# Patient Record
Sex: MALE | Race: WHITE
[De-identification: names, ages, dates, MRNs, and addresses within clinical notes are randomized per-mention and may not be internally consistent; named-entity substitution may affect disease eponyms.]

---

## 2017-01-09 ENCOUNTER — HOSPITAL ENCOUNTER (OUTPATIENT)
Dept: HOSPITAL 35 - HYPER | Age: 67
End: 2017-01-09
Attending: PHYSICIAN ASSISTANT
Payer: COMMERCIAL

## 2017-01-09 DIAGNOSIS — Z89.512: ICD-10-CM

## 2017-01-09 DIAGNOSIS — Z79.84: ICD-10-CM

## 2017-01-09 DIAGNOSIS — E11.621: Primary | ICD-10-CM

## 2017-01-09 DIAGNOSIS — Z89.421: ICD-10-CM

## 2017-01-09 DIAGNOSIS — L97.512: ICD-10-CM

## 2017-01-09 DIAGNOSIS — I10: ICD-10-CM

## 2017-03-29 ENCOUNTER — HOSPITAL ENCOUNTER (OUTPATIENT)
Dept: HOSPITAL 35 - HYPER | Age: 67
End: 2017-03-29
Attending: PREVENTIVE MEDICINE
Payer: COMMERCIAL

## 2017-03-29 DIAGNOSIS — E11.69: ICD-10-CM

## 2017-03-29 DIAGNOSIS — I10: ICD-10-CM

## 2017-03-29 DIAGNOSIS — L97.511: ICD-10-CM

## 2017-03-29 DIAGNOSIS — Z79.84: ICD-10-CM

## 2017-03-29 DIAGNOSIS — E11.621: Primary | ICD-10-CM

## 2017-04-25 ENCOUNTER — HOSPITAL ENCOUNTER (OUTPATIENT)
Dept: HOSPITAL 35 - HYPER | Age: 67
End: 2017-04-25
Attending: SPECIALIST
Payer: COMMERCIAL

## 2017-04-25 DIAGNOSIS — S98.211D: ICD-10-CM

## 2017-04-25 DIAGNOSIS — I10: ICD-10-CM

## 2017-04-25 DIAGNOSIS — L97.512: ICD-10-CM

## 2017-04-25 DIAGNOSIS — E11.621: Primary | ICD-10-CM

## 2017-04-25 DIAGNOSIS — Z79.84: ICD-10-CM

## 2017-05-17 ENCOUNTER — HOSPITAL ENCOUNTER (OUTPATIENT)
Dept: HOSPITAL 35 - HYPER | Age: 67
End: 2017-05-17
Attending: PREVENTIVE MEDICINE
Payer: COMMERCIAL

## 2017-05-17 DIAGNOSIS — Z79.84: ICD-10-CM

## 2017-05-17 DIAGNOSIS — I10: ICD-10-CM

## 2017-05-17 DIAGNOSIS — Z89.512: ICD-10-CM

## 2017-05-17 DIAGNOSIS — L97.512: ICD-10-CM

## 2017-05-17 DIAGNOSIS — E11.621: Primary | ICD-10-CM

## 2017-05-17 DIAGNOSIS — Z89.421: ICD-10-CM

## 2017-06-07 ENCOUNTER — HOSPITAL ENCOUNTER (OUTPATIENT)
Dept: HOSPITAL 35 - HYPER | Age: 67
End: 2017-06-07
Attending: PREVENTIVE MEDICINE
Payer: COMMERCIAL

## 2017-06-07 DIAGNOSIS — Z79.84: ICD-10-CM

## 2017-06-07 DIAGNOSIS — E11.621: Primary | ICD-10-CM

## 2017-06-07 DIAGNOSIS — Z89.512: ICD-10-CM

## 2017-06-07 DIAGNOSIS — L97.512: ICD-10-CM

## 2017-06-07 DIAGNOSIS — I10: ICD-10-CM

## 2017-06-07 DIAGNOSIS — Z89.421: ICD-10-CM

## 2017-06-21 ENCOUNTER — HOSPITAL ENCOUNTER (OUTPATIENT)
Dept: HOSPITAL 35 - HYPER | Age: 67
End: 2017-06-21
Attending: PREVENTIVE MEDICINE
Payer: COMMERCIAL

## 2017-06-21 DIAGNOSIS — Z89.512: ICD-10-CM

## 2017-06-21 DIAGNOSIS — L97.512: ICD-10-CM

## 2017-06-21 DIAGNOSIS — Z89.431: ICD-10-CM
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
## 2017-06-21 DIAGNOSIS — S98.211D: ICD-10-CM

## 2017-06-21 DIAGNOSIS — Z79.84: ICD-10-CM

## 2017-06-21 DIAGNOSIS — E11.621: Primary | ICD-10-CM

## 2017-07-05 ENCOUNTER — HOSPITAL ENCOUNTER (OUTPATIENT)
Dept: HOSPITAL 35 - HYPER | Age: 67
End: 2017-07-05
Attending: PREVENTIVE MEDICINE
Payer: COMMERCIAL

## 2017-07-05 DIAGNOSIS — L97.512: ICD-10-CM

## 2017-07-05 DIAGNOSIS — E11.621: Primary | ICD-10-CM

## 2017-07-05 DIAGNOSIS — Z89.421: ICD-10-CM

## 2017-07-05 DIAGNOSIS — Z79.84: ICD-10-CM

## 2017-07-05 DIAGNOSIS — Z89.512: ICD-10-CM

## 2017-07-05 DIAGNOSIS — I10: ICD-10-CM

## 2017-07-20 ENCOUNTER — HOSPITAL ENCOUNTER (INPATIENT)
Dept: HOSPITAL 35 - TBA | Age: 67
LOS: 2 days | Discharge: HOME | DRG: 617 | End: 2017-07-22
Attending: ORTHOPAEDIC SURGERY | Admitting: ORTHOPAEDIC SURGERY
Payer: COMMERCIAL

## 2017-07-20 VITALS — HEIGHT: 67.99 IN | WEIGHT: 235 LBS | BODY MASS INDEX: 35.61 KG/M2

## 2017-07-20 VITALS — SYSTOLIC BLOOD PRESSURE: 103 MMHG | DIASTOLIC BLOOD PRESSURE: 62 MMHG

## 2017-07-20 VITALS — DIASTOLIC BLOOD PRESSURE: 81 MMHG | SYSTOLIC BLOOD PRESSURE: 117 MMHG

## 2017-07-20 DIAGNOSIS — Z89.421: ICD-10-CM

## 2017-07-20 DIAGNOSIS — Z89.512: ICD-10-CM

## 2017-07-20 DIAGNOSIS — E11.69: Primary | ICD-10-CM

## 2017-07-20 DIAGNOSIS — I10: ICD-10-CM

## 2017-07-20 DIAGNOSIS — B95.62: ICD-10-CM

## 2017-07-20 DIAGNOSIS — Z79.899: ICD-10-CM

## 2017-07-20 DIAGNOSIS — K21.9: ICD-10-CM

## 2017-07-20 DIAGNOSIS — E11.621: ICD-10-CM

## 2017-07-20 DIAGNOSIS — Z79.4: ICD-10-CM

## 2017-07-20 DIAGNOSIS — M19.90: ICD-10-CM

## 2017-07-20 DIAGNOSIS — M86.171: ICD-10-CM

## 2017-07-20 DIAGNOSIS — E11.42: ICD-10-CM

## 2017-07-20 LAB
ANION GAP SERPL CALC-SCNC: 9 MMOL/L (ref 7–16)
BUN SERPL-MCNC: 26 MG/DL (ref 7–18)
CALCIUM SERPL-MCNC: 9.3 MG/DL (ref 8.5–10.1)
CHLORIDE SERPL-SCNC: 102 MMOL/L (ref 98–107)
CO2 SERPL-SCNC: 28 MMOL/L (ref 21–32)
CREAT SERPL-MCNC: 0.9 MG/DL (ref 0.7–1.3)
ERYTHROCYTE [DISTWIDTH] IN BLOOD BY AUTOMATED COUNT: 15 % (ref 10.5–14.5)
GLUCOSE SERPL-MCNC: 133 MG/DL (ref 74–106)
HCT VFR BLD CALC: 47.6 % (ref 42–52)
HGB BLD-MCNC: 16.1 GM/DL (ref 14–18)
MCH RBC QN AUTO: 27.2 PG (ref 26–34)
MCHC RBC AUTO-ENTMCNC: 33.9 G/DL (ref 28–37)
MCV RBC: 80.2 FL (ref 80–100)
PLATELET # BLD: 138 THOU/UL (ref 150–400)
POTASSIUM SERPL-SCNC: 4.1 MMOL/L (ref 3.5–5.1)
RBC # BLD AUTO: 5.93 MIL/UL (ref 4.5–6)
SODIUM SERPL-SCNC: 139 MMOL/L (ref 136–145)
WBC # BLD AUTO: 6.7 THOU/UL (ref 4–11)

## 2017-07-20 PROCEDURE — 57091: CPT

## 2017-07-20 PROCEDURE — 0QBN0ZZ EXCISION OF RIGHT METATARSAL, OPEN APPROACH: ICD-10-PCS | Performed by: ORTHOPAEDIC SURGERY

## 2017-07-20 PROCEDURE — 10785: CPT

## 2017-07-20 PROCEDURE — 62900: CPT

## 2017-07-20 PROCEDURE — 50951 ENDOSCOPY OF URETER: CPT

## 2017-07-20 PROCEDURE — 56525: CPT

## 2017-07-20 PROCEDURE — 50010 RENAL EXPLORATION: CPT

## 2017-07-20 PROCEDURE — 62110: CPT

## 2017-07-20 PROCEDURE — 0Y6M0ZC DETACHMENT AT RIGHT FOOT, PARTIAL 3RD RAY, OPEN APPROACH: ICD-10-PCS | Performed by: ORTHOPAEDIC SURGERY

## 2017-07-20 PROCEDURE — 70005: CPT

## 2017-07-20 PROCEDURE — 50386 REMOVE STENT VIA TRANSURETH: CPT

## 2017-07-20 PROCEDURE — 50101: CPT

## 2017-07-21 VITALS — SYSTOLIC BLOOD PRESSURE: 111 MMHG | DIASTOLIC BLOOD PRESSURE: 53 MMHG

## 2017-07-21 VITALS — SYSTOLIC BLOOD PRESSURE: 127 MMHG | DIASTOLIC BLOOD PRESSURE: 85 MMHG

## 2017-07-21 VITALS — DIASTOLIC BLOOD PRESSURE: 68 MMHG | SYSTOLIC BLOOD PRESSURE: 118 MMHG

## 2017-07-21 VITALS — SYSTOLIC BLOOD PRESSURE: 146 MMHG | DIASTOLIC BLOOD PRESSURE: 80 MMHG

## 2017-07-21 LAB
ANION GAP SERPL CALC-SCNC: 7 MMOL/L (ref 7–16)
CHLORIDE SERPL-SCNC: 102 MMOL/L (ref 98–107)
CO2 SERPL-SCNC: 28 MMOL/L (ref 21–32)
HCT VFR BLD CALC: 44.8 % (ref 42–52)
HGB BLD-MCNC: 14.8 GM/DL (ref 14–18)
POTASSIUM SERPL-SCNC: 4 MMOL/L (ref 3.5–5.1)
SODIUM SERPL-SCNC: 137 MMOL/L (ref 136–145)

## 2017-07-22 VITALS — SYSTOLIC BLOOD PRESSURE: 131 MMHG | DIASTOLIC BLOOD PRESSURE: 75 MMHG

## 2017-07-22 VITALS — SYSTOLIC BLOOD PRESSURE: 127 MMHG | DIASTOLIC BLOOD PRESSURE: 85 MMHG

## 2017-07-22 VITALS — SYSTOLIC BLOOD PRESSURE: 142 MMHG | DIASTOLIC BLOOD PRESSURE: 85 MMHG

## 2017-07-27 ENCOUNTER — HOSPITAL ENCOUNTER (OUTPATIENT)
Dept: HOSPITAL 35 - HYPER | Age: 67
End: 2017-07-27
Attending: PREVENTIVE MEDICINE
Payer: COMMERCIAL

## 2017-07-27 DIAGNOSIS — Z89.512: ICD-10-CM

## 2017-07-27 DIAGNOSIS — L97.512: ICD-10-CM

## 2017-07-27 DIAGNOSIS — Z89.421: ICD-10-CM

## 2017-07-27 DIAGNOSIS — Z79.84: ICD-10-CM

## 2017-07-27 DIAGNOSIS — E11.621: ICD-10-CM

## 2017-07-27 DIAGNOSIS — T81.89XD: Primary | ICD-10-CM
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
## 2017-07-27 DIAGNOSIS — I10: ICD-10-CM

## 2017-08-10 ENCOUNTER — HOSPITAL ENCOUNTER (OUTPATIENT)
Dept: HOSPITAL 35 - HYPER | Age: 67
End: 2017-08-10
Attending: PREVENTIVE MEDICINE
Payer: COMMERCIAL

## 2017-08-10 DIAGNOSIS — L97.512: ICD-10-CM

## 2017-08-10 DIAGNOSIS — I10: ICD-10-CM
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
## 2017-08-10 DIAGNOSIS — E11.621: ICD-10-CM

## 2017-08-10 DIAGNOSIS — Z89.431: ICD-10-CM

## 2017-08-10 DIAGNOSIS — Z89.512: ICD-10-CM

## 2017-08-10 DIAGNOSIS — T81.89XD: Primary | ICD-10-CM

## 2017-08-10 DIAGNOSIS — Z79.84: ICD-10-CM

## 2017-08-10 DIAGNOSIS — Z79.2: ICD-10-CM

## 2017-08-18 ENCOUNTER — HOSPITAL ENCOUNTER (OUTPATIENT)
Dept: HOSPITAL 35 - HYPER | Age: 67
End: 2017-08-18
Attending: PREVENTIVE MEDICINE
Payer: COMMERCIAL

## 2017-08-18 DIAGNOSIS — Z79.2: ICD-10-CM

## 2017-08-18 DIAGNOSIS — Z79.84: ICD-10-CM

## 2017-08-18 DIAGNOSIS — E11.69: ICD-10-CM

## 2017-08-18 DIAGNOSIS — L97.512: ICD-10-CM

## 2017-08-18 DIAGNOSIS — I10: ICD-10-CM

## 2017-08-18 DIAGNOSIS — Z89.512: ICD-10-CM

## 2017-08-18 DIAGNOSIS — E11.621: Primary | ICD-10-CM

## 2017-08-18 DIAGNOSIS — Z89.421: ICD-10-CM

## 2018-01-10 ENCOUNTER — HOSPITAL ENCOUNTER (OUTPATIENT)
Dept: HOSPITAL 61 - PCVCCLINIC | Age: 68
Discharge: HOME | End: 2018-01-10
Attending: INTERNAL MEDICINE
Payer: MEDICARE

## 2018-01-10 DIAGNOSIS — R94.31: ICD-10-CM

## 2018-01-10 DIAGNOSIS — E11.9: ICD-10-CM

## 2018-01-10 DIAGNOSIS — Z79.84: ICD-10-CM

## 2018-01-10 DIAGNOSIS — E78.5: ICD-10-CM

## 2018-01-10 DIAGNOSIS — Z79.899: ICD-10-CM

## 2018-01-10 DIAGNOSIS — I10: Primary | ICD-10-CM

## 2018-01-10 DIAGNOSIS — I45.10: ICD-10-CM

## 2018-01-10 DIAGNOSIS — I45.2: ICD-10-CM

## 2018-01-10 PROCEDURE — 80061 LIPID PANEL: CPT

## 2018-01-10 PROCEDURE — 93005 ELECTROCARDIOGRAM TRACING: CPT

## 2018-01-30 ENCOUNTER — HOSPITAL ENCOUNTER (OUTPATIENT)
Dept: HOSPITAL 61 - PCVCIMAG | Age: 68
Discharge: HOME | End: 2018-01-30
Attending: INTERNAL MEDICINE
Payer: MEDICARE

## 2018-01-30 DIAGNOSIS — E11.9: ICD-10-CM

## 2018-01-30 DIAGNOSIS — I87.2: Primary | ICD-10-CM

## 2018-01-30 DIAGNOSIS — Z79.899: ICD-10-CM

## 2018-01-30 DIAGNOSIS — E78.00: ICD-10-CM

## 2018-01-30 DIAGNOSIS — Z79.84: ICD-10-CM

## 2018-01-30 DIAGNOSIS — I10: ICD-10-CM

## 2018-01-30 DIAGNOSIS — I73.9: ICD-10-CM

## 2018-01-30 PROCEDURE — 93971 EXTREMITY STUDY: CPT

## 2018-01-30 PROCEDURE — 93926 LOWER EXTREMITY STUDY: CPT

## 2018-07-18 ENCOUNTER — HOSPITAL ENCOUNTER (OUTPATIENT)
Dept: HOSPITAL 61 - PCVCIMAG | Age: 68
Discharge: HOME | End: 2018-07-18
Attending: INTERNAL MEDICINE
Payer: MEDICARE

## 2018-07-18 DIAGNOSIS — E78.5: ICD-10-CM

## 2018-07-18 DIAGNOSIS — E11.9: ICD-10-CM

## 2018-07-18 DIAGNOSIS — Z79.84: ICD-10-CM

## 2018-07-18 DIAGNOSIS — Z87.891: ICD-10-CM

## 2018-07-18 DIAGNOSIS — E78.00: Primary | ICD-10-CM

## 2018-07-18 DIAGNOSIS — R94.31: ICD-10-CM

## 2018-07-18 DIAGNOSIS — I10: ICD-10-CM

## 2018-07-18 DIAGNOSIS — I45.2: ICD-10-CM

## 2018-07-18 PROCEDURE — 93306 TTE W/DOPPLER COMPLETE: CPT

## 2018-07-18 PROCEDURE — 80061 LIPID PANEL: CPT

## 2018-07-18 PROCEDURE — 93005 ELECTROCARDIOGRAM TRACING: CPT

## 2019-01-22 ENCOUNTER — HOSPITAL ENCOUNTER (OUTPATIENT)
Dept: HOSPITAL 61 - PCVCCLINIC | Age: 69
Discharge: HOME | End: 2019-01-22
Attending: RADIOLOGY
Payer: MEDICARE

## 2019-01-22 DIAGNOSIS — Z79.84: ICD-10-CM

## 2019-01-22 DIAGNOSIS — E11.9: ICD-10-CM

## 2019-01-22 DIAGNOSIS — I73.9: ICD-10-CM

## 2019-01-22 DIAGNOSIS — E78.00: ICD-10-CM

## 2019-01-22 DIAGNOSIS — I10: ICD-10-CM

## 2019-01-22 DIAGNOSIS — I87.2: Primary | ICD-10-CM

## 2019-01-22 DIAGNOSIS — R94.31: ICD-10-CM

## 2019-01-22 DIAGNOSIS — I45.2: ICD-10-CM

## 2019-01-22 PROCEDURE — 93005 ELECTROCARDIOGRAM TRACING: CPT

## 2019-01-22 PROCEDURE — G0463 HOSPITAL OUTPT CLINIC VISIT: HCPCS

## 2019-01-24 ENCOUNTER — HOSPITAL ENCOUNTER (OUTPATIENT)
Dept: HOSPITAL 61 - PCVCCLINIC | Age: 69
Discharge: HOME | End: 2019-01-24
Attending: INTERNAL MEDICINE
Payer: MEDICARE

## 2019-01-24 DIAGNOSIS — E11.9: ICD-10-CM

## 2019-01-24 DIAGNOSIS — Z79.899: ICD-10-CM

## 2019-01-24 DIAGNOSIS — I45.2: ICD-10-CM

## 2019-01-24 DIAGNOSIS — E78.5: ICD-10-CM

## 2019-01-24 DIAGNOSIS — I10: Primary | ICD-10-CM

## 2019-01-24 PROCEDURE — 93005 ELECTROCARDIOGRAM TRACING: CPT

## 2019-01-24 PROCEDURE — 36415 COLL VENOUS BLD VENIPUNCTURE: CPT

## 2019-01-24 PROCEDURE — 80061 LIPID PANEL: CPT

## 2019-01-24 PROCEDURE — G0463 HOSPITAL OUTPT CLINIC VISIT: HCPCS

## 2019-02-15 ENCOUNTER — HOSPITAL ENCOUNTER (OUTPATIENT)
Dept: HOSPITAL 61 - PCVCINTER | Age: 69
Discharge: HOME | End: 2019-02-15
Attending: RADIOLOGY
Payer: MEDICARE

## 2019-02-15 DIAGNOSIS — Z89.432: ICD-10-CM

## 2019-02-15 DIAGNOSIS — E78.00: ICD-10-CM

## 2019-02-15 DIAGNOSIS — I87.2: ICD-10-CM

## 2019-02-15 DIAGNOSIS — I10: ICD-10-CM

## 2019-02-15 DIAGNOSIS — Z98.890: ICD-10-CM

## 2019-02-15 DIAGNOSIS — I45.2: ICD-10-CM

## 2019-02-15 DIAGNOSIS — Z72.89: ICD-10-CM

## 2019-02-15 DIAGNOSIS — I83.811: Primary | ICD-10-CM

## 2019-02-15 DIAGNOSIS — E11.9: ICD-10-CM

## 2019-02-15 DIAGNOSIS — Z79.84: ICD-10-CM

## 2019-02-15 DIAGNOSIS — Z79.899: ICD-10-CM

## 2019-02-15 DIAGNOSIS — Z89.421: ICD-10-CM

## 2019-02-15 PROCEDURE — C1751 CATH, INF, PER/CENT/MIDLINE: HCPCS

## 2019-02-15 PROCEDURE — 36478 ENDOVENOUS LASER 1ST VEIN: CPT

## 2019-02-15 PROCEDURE — C1769 GUIDE WIRE: HCPCS

## 2019-02-15 PROCEDURE — C1894 INTRO/SHEATH, NON-LASER: HCPCS

## 2019-02-15 PROCEDURE — 36479 ENDOVENOUS LASER VEIN ADDON: CPT

## 2019-02-15 PROCEDURE — 37765 STAB PHLEB VEINS XTR 10-20: CPT

## 2019-02-15 NOTE — PCVCINTER
EXAM:

1.  RIGHT GREAT SAPHENOUS VEIN ENDOVENOUS LASER ABLATION

2. MICRO STAB PHLEBECTOMIES OF THE MEDIAL RIGHT THIGH AND KNEE.



INDICATION: Chronic Venous Insufficiency Class 4a. Leg pain and

swelling. Failed conservative therapy including medical grade

compression stockings for at least 3 months. Venous hypertension

chronic.



PROCEDURE:

Procedure and risks of endovenous laser ablation including thrombosis,

vascular injury, nerve injury, skin necrosis, and infection were

discussed with the patient and consent obtained. The right leg was

prepped and draped in the normal sterile fashion. Using ultrasound

guidance access into the right great saphenous vein was obtained and a

5F 75 cm long catheter was advanced to 2 cm below the saphenofemoral

junction. The laser fiber was advanced through the catheter to its tip

and the catheter partially retracted. Abundant tumescent anesthesia

using a dilute lidocaine solution was given in the perivenous tissues

throughout the length of the laser fiber. Ultrasound confirmed good

position of the distal tip of the laser fiber as well as direct

transcutaneous visualization. The 1470 Dornier laser was set to 6

fuller and a slow continuous pull-back technique employed to deliver

2886 Joules throughout the treated segment. Catheter and fiber were

removed and hemostasis obtained. Multiple 10 - 20 stab incision

micro-phlebectomies were performed in the medial mid/lower right thigh

and along the medial aspect of the right knee.. No immediate

complications. The leg was dressed and wrapped appropriately and

reinforced with a compression stocking.



IMPRESSION:

Satisfactory endovenous laser ablation of the right great saphenous

vein.

Satisfactory stab microphlebectomies in the right leg as noted above.



LOC:[id number]

## 2019-05-16 ENCOUNTER — HOSPITAL ENCOUNTER (OUTPATIENT)
Dept: HOSPITAL 61 - PCVCIMAG | Age: 69
Discharge: HOME | End: 2019-05-16
Attending: RADIOLOGY
Payer: MEDICARE

## 2019-05-16 DIAGNOSIS — E78.5: ICD-10-CM

## 2019-05-16 DIAGNOSIS — E78.00: ICD-10-CM

## 2019-05-16 DIAGNOSIS — E11.9: ICD-10-CM

## 2019-05-16 DIAGNOSIS — I87.2: Primary | ICD-10-CM

## 2019-05-16 DIAGNOSIS — I73.9: ICD-10-CM

## 2019-05-16 DIAGNOSIS — I10: ICD-10-CM

## 2019-05-16 PROCEDURE — 93971 EXTREMITY STUDY: CPT

## 2019-05-16 PROCEDURE — G0463 HOSPITAL OUTPT CLINIC VISIT: HCPCS

## 2019-05-16 NOTE — PCVCIMAG
EXAM: RIGHT SUPERFICIAL VENOUS DUPLEX



INDICATION: Leg pain and swelling.



FINDINGS: 



Right leg: No thrombus in the common femoral, main femoral, or

popliteal veins. These veins are compressible.



Right Great Saphenous Vein: Occlusion throughout the length of the

great saphenous saphenous vein consistent with satisfactory prior

ablation procedure.



Right Small Saphenous Vein: At the saphenopopliteal junction the

diameter is 5.5 mm, and in the calf it is 2.9 mm. There is significant

venous insufficiency/reflux throughout. Venous insufficiency/reflux

duration is 3.2 seconds. There is not a cranial extension present.



IMPRESSION: 

Right Great Saphenous Vein: Satisfactory post ablation change in the

right great saphenous vein.

Right Small Saphenous Vein: Significant  venous insufficiency/reflux

is present as noted above.  Note is made the vein is diminutive in

size.





LOC:[id number]

## 2019-07-26 ENCOUNTER — HOSPITAL ENCOUNTER (OUTPATIENT)
Dept: HOSPITAL 61 - PCVCCLINIC | Age: 69
Discharge: HOME | End: 2019-07-26
Attending: INTERNAL MEDICINE
Payer: MEDICARE

## 2019-07-26 DIAGNOSIS — I45.2: Primary | ICD-10-CM

## 2019-07-26 DIAGNOSIS — E11.9: ICD-10-CM

## 2019-07-26 DIAGNOSIS — I10: ICD-10-CM

## 2019-07-26 DIAGNOSIS — E78.5: ICD-10-CM

## 2019-07-26 PROCEDURE — G0463 HOSPITAL OUTPT CLINIC VISIT: HCPCS

## 2019-07-26 PROCEDURE — 93005 ELECTROCARDIOGRAM TRACING: CPT

## 2019-07-26 PROCEDURE — 36415 COLL VENOUS BLD VENIPUNCTURE: CPT

## 2019-07-26 PROCEDURE — 80061 LIPID PANEL: CPT

## 2020-03-25 ENCOUNTER — HOSPITAL ENCOUNTER (OUTPATIENT)
Dept: HOSPITAL 35 - SJCVCIMAG | Age: 70
End: 2020-03-25
Attending: RADIOLOGY
Payer: COMMERCIAL

## 2020-03-25 DIAGNOSIS — I87.2: Primary | ICD-10-CM

## 2020-03-25 DIAGNOSIS — I10: ICD-10-CM
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
## 2020-03-25 DIAGNOSIS — S88.119A: ICD-10-CM
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
## 2020-03-25 DIAGNOSIS — Z79.899: ICD-10-CM
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
## 2020-03-25 DIAGNOSIS — E78.00: ICD-10-CM

## 2020-03-25 DIAGNOSIS — E11.9: ICD-10-CM

## 2020-03-31 ENCOUNTER — HOSPITAL ENCOUNTER (OUTPATIENT)
Dept: HOSPITAL 35 - CATH | Age: 70
Discharge: HOME | End: 2020-03-31
Attending: RADIOLOGY
Payer: COMMERCIAL

## 2020-03-31 VITALS — HEIGHT: 68 IN | WEIGHT: 216.05 LBS | BODY MASS INDEX: 32.74 KG/M2

## 2020-03-31 VITALS — SYSTOLIC BLOOD PRESSURE: 118 MMHG | DIASTOLIC BLOOD PRESSURE: 72 MMHG

## 2020-03-31 DIAGNOSIS — Z79.899: ICD-10-CM

## 2020-03-31 DIAGNOSIS — I87.1: ICD-10-CM

## 2020-03-31 DIAGNOSIS — Z98.890: ICD-10-CM

## 2020-03-31 DIAGNOSIS — E11.9: ICD-10-CM

## 2020-03-31 DIAGNOSIS — I10: ICD-10-CM

## 2020-03-31 DIAGNOSIS — Z98.84: ICD-10-CM

## 2020-03-31 DIAGNOSIS — I87.2: Primary | ICD-10-CM

## 2020-03-31 DIAGNOSIS — K21.9: ICD-10-CM

## 2020-03-31 DIAGNOSIS — Z89.512: ICD-10-CM

## 2020-03-31 LAB
ANION GAP SERPL CALC-SCNC: 13 MMOL/L (ref 7–16)
BASOPHILS NFR BLD AUTO: 0.9 % (ref 0–2)
BUN SERPL-MCNC: 19 MG/DL (ref 7–18)
CALCIUM SERPL-MCNC: 10 MG/DL (ref 8.5–10.1)
CHLORIDE SERPL-SCNC: 99 MMOL/L (ref 98–107)
CO2 SERPL-SCNC: 23 MMOL/L (ref 21–32)
CREAT SERPL-MCNC: 0.9 MG/DL (ref 0.7–1.3)
EOSINOPHIL NFR BLD: 1.5 % (ref 0–3)
ERYTHROCYTE [DISTWIDTH] IN BLOOD BY AUTOMATED COUNT: 14 % (ref 10.5–14.5)
GLUCOSE SERPL-MCNC: 211 MG/DL (ref 74–106)
GRANULOCYTES NFR BLD MANUAL: 57 % (ref 36–66)
HCT VFR BLD CALC: 48.8 % (ref 42–52)
HGB BLD-MCNC: 16.2 GM/DL (ref 14–18)
LYMPHOCYTES NFR BLD AUTO: 31.8 % (ref 24–44)
MCH RBC QN AUTO: 28.1 PG (ref 26–34)
MCHC RBC AUTO-ENTMCNC: 33.2 G/DL (ref 28–37)
MCV RBC: 84.5 FL (ref 80–100)
MONOCYTES NFR BLD: 8.8 % (ref 1–8)
NEUTROPHILS # BLD: 3.1 THOU/UL (ref 1.4–8.2)
PLATELET # BLD: 157 THOU/UL (ref 150–400)
POTASSIUM SERPL-SCNC: 4.1 MMOL/L (ref 3.5–5.1)
RBC # BLD AUTO: 5.78 MIL/UL (ref 4.5–6)
SODIUM SERPL-SCNC: 135 MMOL/L (ref 136–145)
WBC # BLD AUTO: 5.4 THOU/UL (ref 4–11)

## 2021-10-14 ENCOUNTER — HOSPITAL ENCOUNTER (OUTPATIENT)
Dept: HOSPITAL 35 - HYPER | Age: 71
End: 2021-10-14
Attending: PREVENTIVE MEDICINE
Payer: COMMERCIAL

## 2021-10-14 DIAGNOSIS — E11.621: Primary | ICD-10-CM

## 2021-10-14 DIAGNOSIS — Z89.512: ICD-10-CM

## 2021-10-14 DIAGNOSIS — Z79.899: ICD-10-CM

## 2021-10-14 DIAGNOSIS — E11.40: ICD-10-CM

## 2021-10-14 DIAGNOSIS — Z79.84: ICD-10-CM

## 2021-10-14 DIAGNOSIS — Z89.431: ICD-10-CM

## 2021-10-14 DIAGNOSIS — R21: ICD-10-CM

## 2021-10-14 DIAGNOSIS — L84: ICD-10-CM

## 2021-10-14 DIAGNOSIS — I10: ICD-10-CM

## 2021-10-14 DIAGNOSIS — L97.512: ICD-10-CM

## 2021-10-14 DIAGNOSIS — E11.69: ICD-10-CM

## 2021-11-11 ENCOUNTER — HOSPITAL ENCOUNTER (OUTPATIENT)
Dept: HOSPITAL 35 - HYPER | Age: 71
End: 2021-11-11
Attending: PREVENTIVE MEDICINE
Payer: COMMERCIAL

## 2021-11-11 DIAGNOSIS — L97.512: ICD-10-CM

## 2021-11-11 DIAGNOSIS — E11.621: Primary | ICD-10-CM

## 2021-11-11 DIAGNOSIS — Z79.84: ICD-10-CM

## 2021-11-11 DIAGNOSIS — Z89.431: ICD-10-CM

## 2021-11-11 DIAGNOSIS — L84: ICD-10-CM

## 2021-11-11 DIAGNOSIS — R21: ICD-10-CM

## 2021-11-11 DIAGNOSIS — Z89.512: ICD-10-CM

## 2021-11-11 DIAGNOSIS — E11.69: ICD-10-CM

## 2021-11-11 DIAGNOSIS — K21.9: ICD-10-CM

## 2021-11-11 DIAGNOSIS — E11.40: ICD-10-CM

## 2021-11-11 DIAGNOSIS — I10: ICD-10-CM

## 2021-11-18 ENCOUNTER — HOSPITAL ENCOUNTER (OUTPATIENT)
Dept: HOSPITAL 35 - SJCVCIMAG | Age: 71
End: 2021-11-18
Attending: RADIOLOGY
Payer: COMMERCIAL

## 2021-11-18 ENCOUNTER — HOSPITAL ENCOUNTER (OUTPATIENT)
Dept: HOSPITAL 35 - HYPER | Age: 71
End: 2021-11-18
Attending: PREVENTIVE MEDICINE
Payer: COMMERCIAL

## 2021-11-18 DIAGNOSIS — L97.412: ICD-10-CM

## 2021-11-18 DIAGNOSIS — K21.9: ICD-10-CM

## 2021-11-18 DIAGNOSIS — Z79.84: ICD-10-CM

## 2021-11-18 DIAGNOSIS — R94.31: ICD-10-CM

## 2021-11-18 DIAGNOSIS — I73.9: ICD-10-CM

## 2021-11-18 DIAGNOSIS — E11.621: Primary | ICD-10-CM

## 2021-11-18 DIAGNOSIS — E78.5: ICD-10-CM

## 2021-11-18 DIAGNOSIS — Z79.899: ICD-10-CM

## 2021-11-18 DIAGNOSIS — R21: ICD-10-CM

## 2021-11-18 DIAGNOSIS — Z89.431: ICD-10-CM

## 2021-11-18 DIAGNOSIS — E11.40: ICD-10-CM

## 2021-11-18 DIAGNOSIS — S88.119A: Primary | ICD-10-CM

## 2021-11-18 DIAGNOSIS — E11.9: ICD-10-CM
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
## 2021-11-18 DIAGNOSIS — I87.2: ICD-10-CM

## 2021-11-18 DIAGNOSIS — E11.69: ICD-10-CM

## 2021-11-18 DIAGNOSIS — I45.2: ICD-10-CM
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
## 2021-11-18 DIAGNOSIS — L84: ICD-10-CM

## 2021-11-18 DIAGNOSIS — Z89.512: ICD-10-CM

## 2021-11-18 DIAGNOSIS — I10: ICD-10-CM
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
## 2021-11-18 DIAGNOSIS — R00.2: ICD-10-CM

## 2021-11-18 DIAGNOSIS — L97.512: ICD-10-CM
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
## 2021-11-18 DIAGNOSIS — E78.00: ICD-10-CM

## 2021-11-18 DIAGNOSIS — M79.89: ICD-10-CM

## 2021-12-02 ENCOUNTER — HOSPITAL ENCOUNTER (OUTPATIENT)
Dept: HOSPITAL 35 - HYPER | Age: 71
End: 2021-12-02
Attending: PREVENTIVE MEDICINE
Payer: COMMERCIAL

## 2021-12-02 DIAGNOSIS — L84: ICD-10-CM

## 2021-12-02 DIAGNOSIS — L97.412: ICD-10-CM

## 2021-12-02 DIAGNOSIS — I10: ICD-10-CM

## 2021-12-02 DIAGNOSIS — E11.40: ICD-10-CM

## 2021-12-02 DIAGNOSIS — Z89.512: ICD-10-CM

## 2021-12-02 DIAGNOSIS — E11.621: Primary | ICD-10-CM

## 2021-12-02 DIAGNOSIS — Z79.84: ICD-10-CM

## 2021-12-02 DIAGNOSIS — R21: ICD-10-CM

## 2021-12-02 DIAGNOSIS — E11.69: ICD-10-CM

## 2021-12-02 DIAGNOSIS — Z89.431: ICD-10-CM

## 2021-12-02 DIAGNOSIS — K21.9: ICD-10-CM

## 2021-12-02 DIAGNOSIS — L97.512: ICD-10-CM

## 2021-12-16 ENCOUNTER — HOSPITAL ENCOUNTER (OUTPATIENT)
Dept: HOSPITAL 35 - HYPER | Age: 71
End: 2021-12-16
Attending: PREVENTIVE MEDICINE
Payer: COMMERCIAL

## 2021-12-16 DIAGNOSIS — Z89.431: ICD-10-CM

## 2021-12-16 DIAGNOSIS — L97.412: ICD-10-CM

## 2021-12-16 DIAGNOSIS — E11.69: ICD-10-CM

## 2021-12-16 DIAGNOSIS — E11.621: Primary | ICD-10-CM

## 2021-12-16 DIAGNOSIS — L84: ICD-10-CM

## 2021-12-16 DIAGNOSIS — Z89.512: ICD-10-CM

## 2021-12-16 DIAGNOSIS — L97.512: ICD-10-CM

## 2021-12-16 DIAGNOSIS — I10: ICD-10-CM

## 2021-12-16 DIAGNOSIS — K21.9: ICD-10-CM

## 2021-12-16 DIAGNOSIS — E11.40: ICD-10-CM

## 2022-01-13 ENCOUNTER — HOSPITAL ENCOUNTER (OUTPATIENT)
Dept: HOSPITAL 35 - HYPER | Age: 72
End: 2022-01-13
Attending: PREVENTIVE MEDICINE
Payer: COMMERCIAL

## 2022-01-13 DIAGNOSIS — E11.69: ICD-10-CM

## 2022-01-13 DIAGNOSIS — L84: ICD-10-CM

## 2022-01-13 DIAGNOSIS — E11.621: Primary | ICD-10-CM

## 2022-01-13 DIAGNOSIS — Z89.431: ICD-10-CM

## 2022-01-13 DIAGNOSIS — E11.40: ICD-10-CM

## 2022-01-13 DIAGNOSIS — K21.9: ICD-10-CM

## 2022-01-13 DIAGNOSIS — L97.512: ICD-10-CM

## 2022-01-13 DIAGNOSIS — Z89.512: ICD-10-CM

## 2022-01-13 DIAGNOSIS — L97.412: ICD-10-CM

## 2022-01-13 DIAGNOSIS — I10: ICD-10-CM

## 2022-01-13 DIAGNOSIS — Z79.84: ICD-10-CM

## 2022-01-31 ENCOUNTER — HOSPITAL ENCOUNTER (OUTPATIENT)
Dept: HOSPITAL 35 - HYPER | Age: 72
End: 2022-01-31
Attending: PREVENTIVE MEDICINE
Payer: COMMERCIAL

## 2022-01-31 DIAGNOSIS — Z79.899: ICD-10-CM

## 2022-01-31 DIAGNOSIS — I10: ICD-10-CM

## 2022-01-31 DIAGNOSIS — Z89.512: ICD-10-CM

## 2022-01-31 DIAGNOSIS — E11.621: Primary | ICD-10-CM

## 2022-01-31 DIAGNOSIS — Z89.431: ICD-10-CM

## 2022-01-31 DIAGNOSIS — E11.42: ICD-10-CM

## 2022-01-31 DIAGNOSIS — Z79.4: ICD-10-CM

## 2022-01-31 DIAGNOSIS — L97.512: ICD-10-CM

## 2022-01-31 DIAGNOSIS — E11.69: ICD-10-CM

## 2022-01-31 DIAGNOSIS — L84: ICD-10-CM

## 2022-02-23 ENCOUNTER — HOSPITAL ENCOUNTER (OUTPATIENT)
Dept: HOSPITAL 35 - HYPER | Age: 72
End: 2022-02-23
Attending: PREVENTIVE MEDICINE
Payer: COMMERCIAL

## 2022-02-23 DIAGNOSIS — L84: ICD-10-CM

## 2022-02-23 DIAGNOSIS — Z79.4: ICD-10-CM

## 2022-02-23 DIAGNOSIS — E11.69: ICD-10-CM

## 2022-02-23 DIAGNOSIS — I10: ICD-10-CM

## 2022-02-23 DIAGNOSIS — Z79.84: ICD-10-CM

## 2022-02-23 DIAGNOSIS — E11.40: ICD-10-CM

## 2022-02-23 DIAGNOSIS — L97.512: ICD-10-CM

## 2022-02-23 DIAGNOSIS — Z79.899: ICD-10-CM

## 2022-02-23 DIAGNOSIS — E11.621: Primary | ICD-10-CM

## 2022-02-23 DIAGNOSIS — Z89.422: ICD-10-CM
